# Patient Record
Sex: MALE
[De-identification: names, ages, dates, MRNs, and addresses within clinical notes are randomized per-mention and may not be internally consistent; named-entity substitution may affect disease eponyms.]

---

## 2021-07-17 ENCOUNTER — TRANSCRIPTION ENCOUNTER (OUTPATIENT)
Age: 26
End: 2021-07-17

## 2022-06-07 ENCOUNTER — NON-APPOINTMENT (OUTPATIENT)
Age: 27
End: 2022-06-07

## 2022-06-09 ENCOUNTER — NON-APPOINTMENT (OUTPATIENT)
Age: 27
End: 2022-06-09

## 2022-06-13 PROBLEM — Z00.00 ENCOUNTER FOR PREVENTIVE HEALTH EXAMINATION: Status: ACTIVE | Noted: 2022-06-13

## 2022-06-14 ENCOUNTER — APPOINTMENT (OUTPATIENT)
Dept: ORTHOPEDIC SURGERY | Facility: CLINIC | Age: 27
End: 2022-06-14
Payer: COMMERCIAL

## 2022-06-14 VITALS
HEART RATE: 76 BPM | SYSTOLIC BLOOD PRESSURE: 116 MMHG | DIASTOLIC BLOOD PRESSURE: 77 MMHG | HEIGHT: 70 IN | WEIGHT: 158 LBS | BODY MASS INDEX: 22.62 KG/M2

## 2022-06-14 PROCEDURE — 73552 X-RAY EXAM OF FEMUR 2/>: CPT | Mod: RT

## 2022-06-14 PROCEDURE — 99204 OFFICE O/P NEW MOD 45 MIN: CPT

## 2022-06-14 PROCEDURE — 73564 X-RAY EXAM KNEE 4 OR MORE: CPT | Mod: RT

## 2022-06-14 NOTE — DISCUSSION/SUMMARY
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  He has quadriceps muscle weakness.  I have discussed the pathology, natural history and treatment options with him.  He is referred for physical therapy.  He will be reevaluated in 6 weeks.  He is started on a course of diclofenac.  Medication risks have been reviewed.
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
## 2022-06-14 NOTE — PHYSICAL EXAM
[Slightly Antalgic] : slightly antalgic [LE] : Sensory: Intact in bilateral lower extremities [DP] : dorsalis pedis 2+ and symmetric bilaterally [PT] : posterior tibial 2+ and symmetric bilaterally [Normal] : Alert and in no acute distress [Poor Appearance] : well-appearing [Acute Distress] : not in acute distress [Obese] : not obese [de-identified] : The patient has no respiratory distress. Mood and affect are normal. The patient is alert and oriented to person, place and time.\par There is no pain with active or passive motion of the hips.  There is no tenderness of the either hip.  Examination of the right thigh demonstrates lateral sided scar.  This is well-healed.  There are anterior scars as well.  He has patellofemoral crepitus.  There is no instability of collateral or cruciate ligaments.  Morales test is negative.  Range of motion 0-125 at both knees.  The calves are soft and nontender.  The skin is intact.  There is no lymphedema.  He has VMO weakness of the right leg [de-identified] : AP, lateral, tunnel and sunrise x-rays of the right knee demonstrate no acute fracture.  There are 2 fixation screws in the distal right femur.  There is a deformity of the distal femur supracondylar area.  There are patellofemoral changes seen on the sunrise view.\par AP and lateral x-rays of the right femur demonstrate healed fracture of the distal femoral shaft to supracondylar region.  There is evidence of prior fixation with removal of device.

## 2022-06-27 ENCOUNTER — APPOINTMENT (OUTPATIENT)
Dept: ORTHOPEDIC SURGERY | Facility: CLINIC | Age: 27
End: 2022-06-27

## 2022-06-27 VITALS
SYSTOLIC BLOOD PRESSURE: 115 MMHG | DIASTOLIC BLOOD PRESSURE: 80 MMHG | HEART RATE: 68 BPM | BODY MASS INDEX: 22.62 KG/M2 | WEIGHT: 158 LBS | HEIGHT: 70 IN

## 2022-06-27 DIAGNOSIS — G89.29 PAIN IN RIGHT KNEE: ICD-10-CM

## 2022-06-27 DIAGNOSIS — M25.561 PAIN IN RIGHT KNEE: ICD-10-CM

## 2022-06-27 PROCEDURE — 99214 OFFICE O/P EST MOD 30 MIN: CPT

## 2022-06-27 RX ORDER — NABUMETONE 750 MG/1
750 TABLET, FILM COATED ORAL
Qty: 60 | Refills: 0 | Status: ACTIVE | COMMUNITY
Start: 2022-06-27 | End: 1900-01-01

## 2022-06-27 RX ORDER — DICLOFENAC SODIUM 75 MG/1
75 TABLET, DELAYED RELEASE ORAL TWICE DAILY
Qty: 60 | Refills: 0 | Status: DISCONTINUED | COMMUNITY
Start: 2022-06-14 | End: 2022-06-27

## 2022-06-27 NOTE — DISCUSSION/SUMMARY
[de-identified] : The patient has continued pain in his right knee.  He has had only one physical therapy session.  I believe he requires more physical therapy.  I have switched him from diclofenac to nabumetone.  I have explained that this may take several weeks for him to get better.  He will be reevaluated in 1 month.

## 2022-06-27 NOTE — PHYSICAL EXAM
[Slightly Antalgic] : slightly antalgic [LE] : Sensory: Intact in bilateral lower extremities [DP] : dorsalis pedis 2+ and symmetric bilaterally [PT] : posterior tibial 2+ and symmetric bilaterally [Normal] : Alert and in no acute distress [Poor Appearance] : well-appearing [Acute Distress] : not in acute distress [Obese] : not obese [de-identified] : The patient has no respiratory distress. Mood and affect are normal. The patient is alert and oriented to person, place and time.\par There is no pain with active or passive motion of the hips.  There is no tenderness of the either hip.  Examination of the right thigh demonstrates lateral sided scar.  This is well-healed.  There are anterior scars as well.  He has patellofemoral crepitus.  There is no instability of collateral or cruciate ligaments.  Morales test is negative.  Range of motion 0-125 at both knees.  The calves are soft and nontender.  The skin is intact.  There is no lymphedema.  He has VMO weakness of the right leg.  Right lower extremity is 1.5 to 2.0 cm shorter than the left
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
## 2024-01-30 ENCOUNTER — NON-APPOINTMENT (OUTPATIENT)
Age: 29
End: 2024-01-30